# Patient Record
Sex: MALE | Race: BLACK OR AFRICAN AMERICAN | NOT HISPANIC OR LATINO | URBAN - METROPOLITAN AREA
[De-identification: names, ages, dates, MRNs, and addresses within clinical notes are randomized per-mention and may not be internally consistent; named-entity substitution may affect disease eponyms.]

---

## 2018-07-19 ENCOUNTER — OFFICE VISIT (OUTPATIENT)
Dept: URBAN - METROPOLITAN AREA CLINIC 17 | Facility: CLINIC | Age: 23
End: 2018-07-19
Payer: COMMERCIAL

## 2018-07-19 DIAGNOSIS — H53.042 AMBLYOPIA SUSPECT, LEFT EYE: Primary | ICD-10-CM

## 2018-07-19 DIAGNOSIS — H52.13 MYOPIA: ICD-10-CM

## 2018-07-19 PROCEDURE — 99213 OFFICE O/P EST LOW 20 MIN: CPT | Performed by: OPTOMETRIST

## 2018-07-19 ASSESSMENT — VISUAL ACUITY
OD: 20/20
OS: 20/30

## 2018-07-19 ASSESSMENT — INTRAOCULAR PRESSURE
OD: 16
OS: 17

## 2018-07-19 NOTE — IMPRESSION/PLAN
Impression: Myopia: H52.13. Plan: Finalized new glasses Rx. Patient education on appropriate options of eye glasses. Return to clinic in 1 year for complete eye exam and refraction.

## 2018-07-19 NOTE — IMPRESSION/PLAN
Impression: Amblyopia suspect, left eye: H53.042.  Plan: IXT, recommend surgical eval and refraction